# Patient Record
Sex: FEMALE | Race: BLACK OR AFRICAN AMERICAN | NOT HISPANIC OR LATINO | Employment: FULL TIME | URBAN - METROPOLITAN AREA
[De-identification: names, ages, dates, MRNs, and addresses within clinical notes are randomized per-mention and may not be internally consistent; named-entity substitution may affect disease eponyms.]

---

## 2022-03-15 ENCOUNTER — TELEPHONE (OUTPATIENT)
Dept: GASTROENTEROLOGY | Facility: CLINIC | Age: 52
End: 2022-03-15

## 2022-03-15 NOTE — TELEPHONE ENCOUNTER
----- Message from Kayce Wilkerson sent at 3/7/2022  2:23 PM CST -----  Regarding: colonoscopy    ----- Message -----  From: Piedad Armstrong  Sent: 3/7/2022   1:56 PM CST  To: Derrick [facility]A Staff    Pt would like to know when her next colonoscopy is due. Call back is [phone]

## 2022-12-30 DIAGNOSIS — N63.20 MASS OF LEFT BREAST: Primary | ICD-10-CM

## 2023-01-09 ENCOUNTER — OFFICE VISIT (OUTPATIENT)
Dept: SURGERY | Facility: CLINIC | Age: 53
End: 2023-01-09
Payer: COMMERCIAL

## 2023-01-09 DIAGNOSIS — N63.21 MASS OF UPPER OUTER QUADRANT OF LEFT BREAST: ICD-10-CM

## 2023-01-09 PROCEDURE — 1160F RVW MEDS BY RX/DR IN RCRD: CPT | Mod: CPTII,S$GLB,, | Performed by: SURGERY

## 2023-01-09 PROCEDURE — 1159F MED LIST DOCD IN RCRD: CPT | Mod: CPTII,S$GLB,, | Performed by: SURGERY

## 2023-01-09 PROCEDURE — 1159F PR MEDICATION LIST DOCUMENTED IN MEDICAL RECORD: ICD-10-PCS | Mod: CPTII,S$GLB,, | Performed by: SURGERY

## 2023-01-09 PROCEDURE — 99203 PR OFFICE/OUTPT VISIT, NEW, LEVL III, 30-44 MIN: ICD-10-PCS | Mod: S$GLB,,, | Performed by: SURGERY

## 2023-01-09 PROCEDURE — 1160F PR REVIEW ALL MEDS BY PRESCRIBER/CLIN PHARMACIST DOCUMENTED: ICD-10-PCS | Mod: CPTII,S$GLB,, | Performed by: SURGERY

## 2023-01-09 PROCEDURE — 99203 OFFICE O/P NEW LOW 30 MIN: CPT | Mod: S$GLB,,, | Performed by: SURGERY

## 2023-01-09 RX ORDER — SELENIUM SULFIDE 2.5 MG/100ML
LOTION TOPICAL
COMMUNITY
Start: 2022-11-18

## 2023-01-09 RX ORDER — NYSTATIN AND TRIAMCINOLONE ACETONIDE 100000; 1 [USP'U]/G; MG/G
CREAM TOPICAL
COMMUNITY

## 2023-01-09 RX ORDER — ASPIRIN 325 MG
TABLET, DELAYED RELEASE (ENTERIC COATED) ORAL
COMMUNITY

## 2023-01-09 RX ORDER — MUPIROCIN 20 MG/G
OINTMENT TOPICAL
COMMUNITY

## 2023-01-09 RX ORDER — PANTOPRAZOLE SODIUM 40 MG/1
40 TABLET, DELAYED RELEASE ORAL
COMMUNITY
Start: 2022-12-22 | End: 2023-08-10 | Stop reason: SDUPTHER

## 2023-01-09 RX ORDER — LEVOTHYROXINE SODIUM 100 UG/1
TABLET ORAL
COMMUNITY

## 2023-01-09 RX ORDER — CLOBETASOL PROPIONATE 0.5 MG/G
CREAM TOPICAL
COMMUNITY
Start: 2022-12-23

## 2023-01-09 RX ORDER — SITAGLIPTIN AND METFORMIN HYDROCHLORIDE 1000; 50 MG/1; MG/1
TABLET, FILM COATED, EXTENDED RELEASE ORAL
COMMUNITY
Start: 2023-01-04

## 2023-01-09 RX ORDER — AMLODIPINE BESYLATE 10 MG/1
10 TABLET ORAL
COMMUNITY
Start: 2022-12-22

## 2023-01-09 RX ORDER — METHOCARBAMOL 500 MG/1
500 TABLET, FILM COATED ORAL EVERY 8 HOURS PRN
COMMUNITY
Start: 2022-10-26

## 2023-01-09 RX ORDER — LINACLOTIDE 145 UG/1
145 CAPSULE, GELATIN COATED ORAL DAILY PRN
COMMUNITY
Start: 2022-12-22 | End: 2023-08-10

## 2023-01-09 RX ORDER — KETOCONAZOLE 20 MG/G
CREAM TOPICAL
COMMUNITY

## 2023-01-09 RX ORDER — MONTELUKAST SODIUM 10 MG/1
10 TABLET ORAL NIGHTLY
COMMUNITY
Start: 2022-09-21

## 2023-01-09 RX ORDER — HYDROCHLOROTHIAZIDE 25 MG/1
25 TABLET ORAL
COMMUNITY
Start: 2022-12-22

## 2023-01-09 NOTE — PROGRESS NOTES
History & Physical    SUBJECTIVE:     History of Present Illness:    52-year-old female referred by Dr. Monty Apple for 2nd opinion regarding abnormal screening mammogram and ultrasound.  Patient with no family history of breast cancer in his on no hormonal replacement therapy.  She notices no significant change to either breast.  On her mammogram and ultrasound she has a small 0.5 cm hypoechoic mass 2:00 a.m. axis zone 1 that is unchanged compared to prior exam of 10/21.  She is also noted to have a subcentimeter intraparenchymal lymph node in the left breast zone 2.    Chief Complaint   Patient presents with    Breast Problem     Lymph node, left breast         Review of patient's allergies indicates:  Review of patient's allergies indicates:  No Known Allergies    Current Outpatient Medications on File Prior to Visit   Medication Sig Dispense Refill    amLODIPine (NORVASC) 10 MG tablet Take 10 mg by mouth.      aspirin 81 mg Cap aspirin low 81mg chw   CHEW AND SWALLOW 1 TABLET BY MOUTH ONCE DAILY      cholecalciferol, vitamin D3, (DECARA) 1,250 mcg (50,000 unit) capsule Decara 1,250 mcg (50,000 unit) capsule   TAKE 1 CAPSULE BY MOUTH ONCE A WEEK      clobetasoL (TEMOVATE) 0.05 % cream APPLY TO RASH ONL LEGS TWICE DAILY      diphenhydrAMINE-acetaminophen (TYLENOL PM)  mg Tab Take 1 tablet by mouth nightly as needed.      hydroCHLOROthiazide (HYDRODIURIL) 25 MG tablet Take 25 mg by mouth.      JANUMET XR 50-1,000 mg TM24       ketoconazole (NIZORAL) 2 % cream ketoconazole 2 % topical cream   APPLY TO LEG TWICE DAILY      levothyroxine (EUTHYROX) 100 MCG tablet Euthyrox 100 mcg tablet   TAKE 1 TABLET BY MOUTH ONCE DAILY IN THE MORNING      LINZESS 145 mcg Cap capsule Take 145 mcg by mouth daily as needed.      methocarbamoL (ROBAXIN) 500 MG Tab Take 500 mg by mouth every 8 (eight) hours as needed. FOR MUSCLE SPASM      montelukast (SINGULAIR) 10 mg tablet Take 10 mg by mouth every evening.      mupirocin  (BACTROBAN) 2 % ointment mupirocin 2 % topical ointment   APPLY OINTMENT TOPICALLY TO LEG TWICE DAILY      nystatin-triamcinolone (MYCOLOG II) cream nystatin-triamcinolone 100,000 unit/g-0.1 % topical cream   APPLY CREAM TOPICALLY THREE TIMES DAILY AS NEEDED FOR RASH OR ITCHING TO VAGINA AREA      pantoprazole (PROTONIX) 40 MG tablet Take 40 mg by mouth.      selenium sulfide 2.5 % Lotn        No current facility-administered medications on file prior to visit.       Past Medical History:   Diagnosis Date    Cushing's disease     Diabetes mellitus, type 2     Hypertension     Hypothyroidism      Past Surgical History:   Procedure Laterality Date    HYSTERECTOMY      intestinal prolapse  2005    SURGICAL REMOVAL OF PITUITARY TUMOR BY TRANSSPHENOIDAL APPROACH  2019     Family History   Problem Relation Age of Onset    Hypertension Mother     Diabetes Mother     Hypertension Father     Diabetes Father     Lung cancer Father        Social History     Socioeconomic History    Marital status: Single   Tobacco Use    Smoking status: Never    Smokeless tobacco: Never          Review of Systems   Constitutional: Negative.    Respiratory: Negative.     Cardiovascular: Negative.    Gastrointestinal: Negative.    Musculoskeletal: Negative.    Neurological: Negative.    Endo/Heme/Allergies: Negative.    Psychiatric/Behavioral: Negative.       OBJECTIVE:     There were no vitals filed for this visit.              Physical Exam:  Physical Exam  Constitutional:       Appearance: She is normal weight.   Cardiovascular:      Rate and Rhythm: Regular rhythm.   Pulmonary:      Effort: Pulmonary effort is normal.   Chest:      Comments: Bilateral breast examination performed.  Right breast:  No palpable masses or abnormal skin changes noted to the right breast.  Axilla negative.  Nipple areola complex normal.  Left breast:.  No abnormal masses or skin changes noted.  Axilla negative.  Nipple areola complex normal  Musculoskeletal:          General: Normal range of motion.   Skin:     General: Skin is warm and dry.   Neurological:      General: No focal deficit present.      Mental Status: She is alert and oriented to person, place, and time.      Cranial Nerves: No cranial nerve deficit.           ASSESSMENT/PLAN:   5 mm left breast mass, stable.  Subcentimeter lymph node left breast.  Benign findings BI-RADS category 2  PLAN:  Reassurance.  These are likely benign findings and I feel nothing further needs to be done at this time.  Recommend routine yearly screening mammogram back/ ultrasound

## 2023-03-08 ENCOUNTER — TELEPHONE (OUTPATIENT)
Dept: GASTROENTEROLOGY | Facility: CLINIC | Age: 53
End: 2023-03-08
Payer: COMMERCIAL

## 2023-03-08 NOTE — TELEPHONE ENCOUNTER
----- Message from Ana Maria Modi LPN sent at 3/8/2023 10:18 AM CST -----  Contact: self    ----- Message -----  From: Beth Roche  Sent: 3/8/2023   9:43 AM CST  To: Derrick [facility] Staff    Type:  Sooner Apoointment Request    Caller is requesting a sooner appointment.  Caller declined first available appointment listed below.  Caller will not accept being placed on the waitlist and is requesting a message be sent to doctor.  Name of Caller:Anamaria Bahena    When is the first available appointment?04/2023  Symptoms:(hoarseness, difficulty swallowing, gerd   Would the patient rather a call back or a response via [facility]? Call back  Best Call Back Number:[phone]    Additional Information: n/a

## 2023-03-09 NOTE — TELEPHONE ENCOUNTER
The patient is due for her colonoscopy with double-channel colonoscope. Are her upper GI Sx similar to prior or different. May schedule for EGD and colonoscopy with double-channel colonoscope at [facility] if similar to prior. May add on to 3/28/2023 Tuesday.  NBP

## 2023-03-13 ENCOUNTER — TELEPHONE (OUTPATIENT)
Dept: ADMINISTRATIVE | Facility: CLINIC | Age: 53
End: 2023-03-13
Payer: COMMERCIAL

## 2023-03-14 NOTE — TELEPHONE ENCOUNTER
Called pt to see if she was ready to scheduled her EGD/Colon @ [facility]. I informed her that I s/w Nancy in our insurance dept and she stated that her cost would be $75 for her physician fee. However, she was unsure what [facility] would cost. Since, pt has not met her ded she would need to met that first.       Pt would like to wait and schedule when she had the money and knows exactly what [facility] would charge her. I told the pt to contact us back when she was ready. Pt agreed. xochilt

## 2023-08-03 NOTE — TELEPHONE ENCOUNTER
Pt called in to report that she is having a lot of problems. She is aware that she is due for a colonoscopy.  She wants to be seen in office for low back pain, shooting pains in her rectum. She feels pressure. Feels this mostly at night while she is lying down. She knows that this is normal. This has been going on since January. Says she can not ignore it anymore and would like to be seen sooner than next available. She take Linzess 145 daily and is still experiencing constipation. Please advise.

## 2023-08-03 NOTE — TELEPHONE ENCOUNTER
Ok to make next available with NP. She may try to take miralax 1 capful daily with her linzess to see if that helps with the constipation.   KN

## 2023-08-04 NOTE — PROGRESS NOTES
Clinic Note    Reason for visit:  The primary encounter diagnosis was Irritable bowel syndrome with constipation. Diagnoses of Dysphagia, unspecified type, Gastroesophageal reflux disease, unspecified whether esophagitis present, History of colon polyps, Low back pain without sciatica, unspecified back pain laterality, unspecified chronicity, and Urinary frequency were also pertinent to this visit.    PCP: Aranza Barraza       HPI:  This is a 52 y.o. female who is established with Dr. Meza. Patient with IBS-C. She has been on Linzess 145 mcg daily and also takes Miralax and stool softener with stimulant. She has been taking 2 of the Linzess 145 mcg. Since 1/2023, she has had episodes of shooting pains in rectum (which is new to her), as well as low back pain. Feels this more when lying down. Her stool is usually consistency of sand. No blood in stool. She also reports GERD, hoarseness, and dysphagia with pills.  She was having hoarseness on and off since 2017, saw ENT and put on sinus medication but still with hoarseness at times. She has been intentionally losing weight. She takes pantoprazole 40 mg as needed, only when heartburn severe.     She states she just had blood work and dropped off [facility] but Dr. Apple forgot to include UA order. She asked if I could send UA. I told her I would send UA order but if positive, then Dr. Apple would need to treat her and she agreed.      She has h/o functional ACTH pituitary adenoma/Cushing disease, in remission since 2019.     Colonoscopy 11/26/2019 with double channel colonoscope: 1 TA, 2 hyperp. Repeat colon (with adult or double channel) in 3y. Adult colonoscope had broken views.    EGD/Colonoscopy 11/21/2018: Concentric rings in lower third of esophagus, small HH. GBx wnl, EBx reflux. Colonoscopy until ileo-cecal valve reached. The right proximal colon was identified through transabdominal illumination. One polyp removed and not retrieved.      Review  of Systems   Constitutional:  Positive for fatigue. Negative for fever and unexpected weight change.   HENT:  Negative for mouth sores, postnasal drip, sore throat and trouble swallowing.    Eyes:  Negative for pain, discharge and eye dryness.   Respiratory:  Negative for apnea, cough, choking, chest tightness, shortness of breath and wheezing.    Cardiovascular:  Negative for chest pain, palpitations and leg swelling.   Gastrointestinal:  Positive for abdominal distention, constipation, diarrhea, rectal pain and reflux. Negative for abdominal pain, anal bleeding, blood in stool, change in bowel habit, nausea, vomiting, fecal incontinence and change in bowel habit.   Genitourinary:  Positive for bladder incontinence. Negative for dysuria and hematuria.   Musculoskeletal:  Positive for back pain. Negative for arthralgias and joint swelling.   Integumentary:  Negative for color change and rash.   Allergic/Immunologic: Negative for environmental allergies and food allergies.   Neurological:  Negative for seizures and headaches.   Hematological:  Negative for adenopathy. Does not bruise/bleed easily.        Past Medical History:   Diagnosis Date    Cushing's disease     Diabetes mellitus, type 2     H/O: pituitary tumor     Hypertension     Hypothyroidism     IBS (irritable bowel syndrome)     Personal history of colonic polyps      Past Surgical History:   Procedure Laterality Date    HYSTERECTOMY      intestinal prolapse  2005    SURGICAL REMOVAL OF PITUITARY TUMOR BY TRANSSPHENOIDAL APPROACH  2019     Family History   Problem Relation Age of Onset    Hypertension Mother     Diabetes Mother     Hypertension Father     Diabetes Father     Lung cancer Father      Social History     Tobacco Use    Smoking status: Never    Smokeless tobacco: Never     Review of patient's allergies indicates:   Allergen Reactions    Ace inhibitors Swelling    Erythromycin base       Medication List with Changes/Refills   New Medications     LINACLOTIDE (LINZESS) 290 MCG CAP CAPSULE    Take 1 capsule (290 mcg total) by mouth before breakfast.    SODIUM,POTASSIUM,MAG SULFATES (SUPREP BOWEL PREP KIT) 17.5-3.13-1.6 GRAM SOLR    Take according to kit instructions but DO NOT eat breakfast the morning of procedure.   Current Medications    AMLODIPINE (NORVASC) 10 MG TABLET    Take 10 mg by mouth.    BEMPEDOIC ACID/EZETIMIBE (NEXLIZET ORAL)    Take by mouth.    CHOLECALCIFEROL, VITAMIN D3, (DECARA) 1,250 MCG (50,000 UNIT) CAPSULE    Decara 1,250 mcg (50,000 unit) capsule   TAKE 1 CAPSULE BY MOUTH ONCE A WEEK    CLOBETASOL (TEMOVATE) 0.05 % CREAM    APPLY TO RASH ONL LEGS TWICE DAILY    CONTOUR NEXT TEST STRIPS STRP    USE 1 STRIP VIA METER 3 TIMES A DAY AS DIRECTED TO CHECK BLOOD GLUCOSE    DIPHENHYDRAMINE-ACETAMINOPHEN (TYLENOL PM)  MG TAB    Take 1 tablet by mouth nightly as needed.    HYDROCHLOROTHIAZIDE (HYDRODIURIL) 25 MG TABLET    Take 25 mg by mouth.    JANUMET XR 50-1,000 MG TM24        KETOCONAZOLE (NIZORAL) 2 % CREAM    ketoconazole 2 % topical cream   APPLY TO LEG TWICE DAILY    LEVOTHYROXINE (EUTHYROX) 100 MCG TABLET    Euthyrox 100 mcg tablet   TAKE 1 TABLET BY MOUTH ONCE DAILY IN THE MORNING    METHOCARBAMOL (ROBAXIN) 500 MG TAB    Take 500 mg by mouth every 8 (eight) hours as needed. FOR MUSCLE SPASM    MONTELUKAST (SINGULAIR) 10 MG TABLET    Take 10 mg by mouth every evening.    MUPIROCIN (BACTROBAN) 2 % OINTMENT    mupirocin 2 % topical ointment   APPLY OINTMENT TOPICALLY TO LEG TWICE DAILY    NYSTATIN-TRIAMCINOLONE (MYCOLOG II) CREAM    nystatin-triamcinolone 100,000 unit/g-0.1 % topical cream   APPLY CREAM TOPICALLY THREE TIMES DAILY AS NEEDED FOR RASH OR ITCHING TO VAGINA AREA    SELENIUM SULFIDE 2.5 % LOTN       Changed and/or Refilled Medications    Modified Medication Previous Medication    PANTOPRAZOLE (PROTONIX) 40 MG TABLET pantoprazole (PROTONIX) 40 MG tablet       Take 1 tablet (40 mg total) by mouth 2 (two) times daily.     "Take 40 mg by mouth.   Discontinued Medications    ASPIRIN 81 MG CAP    aspirin low 81mg chw   CHEW AND SWALLOW 1 TABLET BY MOUTH ONCE DAILY    LINZESS 145 MCG CAP CAPSULE    Take 145 mcg by mouth daily as needed.         Vital Signs:  /85   Pulse 81   Ht 5' 6" (1.676 m)   Wt 83.9 kg (185 lb)   SpO2 95%   BMI 29.86 kg/m²        Physical Exam  Vitals reviewed.   Constitutional:       General: She is awake. She is not in acute distress.     Appearance: Normal appearance. She is well-developed. She is not ill-appearing, toxic-appearing or diaphoretic.   HENT:      Head: Normocephalic and atraumatic.      Nose: Nose normal.      Mouth/Throat:      Mouth: Mucous membranes are moist.      Pharynx: Oropharynx is clear. No oropharyngeal exudate or posterior oropharyngeal erythema.   Eyes:      General: Lids are normal. Gaze aligned appropriately. No scleral icterus.        Right eye: No discharge.         Left eye: No discharge.      Conjunctiva/sclera: Conjunctivae normal.   Neck:      Trachea: Trachea normal.   Cardiovascular:      Rate and Rhythm: Normal rate and regular rhythm.      Pulses:           Radial pulses are 2+ on the right side and 2+ on the left side.   Pulmonary:      Effort: Pulmonary effort is normal. No respiratory distress.      Breath sounds: No stridor. No wheezing.   Chest:      Chest wall: No tenderness.   Abdominal:      General: Bowel sounds are normal. There is no distension.      Palpations: Abdomen is soft. There is no fluid wave, hepatomegaly or mass.      Tenderness: There is no abdominal tenderness. There is no guarding or rebound.   Musculoskeletal:         General: No tenderness or deformity.      Cervical back: Full passive range of motion without pain and neck supple. No tenderness.      Right lower leg: No edema.      Left lower leg: No edema.   Lymphadenopathy:      Cervical: No cervical adenopathy.   Skin:     General: Skin is warm and dry.      Capillary Refill: Capillary " refill takes less than 2 seconds.      Coloration: Skin is not cyanotic, jaundiced or pale.   Neurological:      General: No focal deficit present.      Mental Status: She is alert and oriented to person, place, and time.      Motor: No tremor.   Psychiatric:         Attention and Perception: Attention normal.         Mood and Affect: Mood and affect normal.         Speech: Speech normal.         Behavior: Behavior normal. Behavior is cooperative.            All of the data above and below has been reviewed by myself and any further interpretations will be reflected in the assessment and plan.   The data includes review of external notes, and independent interpretation of lab results, procedures, x-rays, and imaging reports.      Assessment:  Irritable bowel syndrome with constipation  -     linaCLOtide (LINZESS) 290 mcg Cap capsule; Take 1 capsule (290 mcg total) by mouth before breakfast.  Dispense: 90 capsule; Refill: 3    Dysphagia, unspecified type  -     Ambulatory Referral to External Surgery    Gastroesophageal reflux disease, unspecified whether esophagitis present  -     pantoprazole (PROTONIX) 40 MG tablet; Take 1 tablet (40 mg total) by mouth 2 (two) times daily.  Dispense: 180 tablet; Refill: 1  -     Ambulatory Referral to External Surgery    History of colon polyps  -     Ambulatory Referral to External Surgery  -     sodium,potassium,mag sulfates (SUPREP BOWEL PREP KIT) 17.5-3.13-1.6 gram SolR; Take according to kit instructions but DO NOT eat breakfast the morning of procedure.  Dispense: 1 kit; Refill: 0    Low back pain without sciatica, unspecified back pain laterality, unspecified chronicity  -     Urinalysis, Reflex to Urine Culture Urine, Clean Catch; Future; Expected date: 08/10/2023    Urinary frequency  -     Urinalysis, Reflex to Urine Culture Urine, Clean Catch; Future; Expected date: 08/10/2023    IBS-C: Linzess 145 without relief. She would like to try 290 mcg daily and if does not work  she will take Ibsrela samples. If colonoscopy wnl, she would like to proceed with imaging.  Dysphagia/GERD: concentric rings in esophagus with EBx reflux on EGD 2018. Plan for EBx and will put on panto 40 BID     Recommendations:  Schedule upper and lower endoscopies with Dr. Meza.   Begin taking pantoprazole 40 mg twice a day.   Increase Linzess  to 290 mcg daily. If this does not work you may try Ibsrela twice a day with meals. Notify my office if you would like to change prescription.       Risks, benefits, and alternatives of medical management, any associated procedures, and/or treatment discussed with the patient. Patient given opportunity to ask questions and voices understanding. Patient has elected to proceed with the recommended care modalities as discussed.        Order summary:  Orders Placed This Encounter   Procedures    Urinalysis, Reflex to Urine Culture Urine, Clean Catch    Ambulatory Referral to External Surgery        Instructed patient to notify my office if they have not been contacted within two weeks after any procedures, submitting any samples (biopsies, blood, stool, urine, etc.) or after any imaging (X-ray, CT, MRI, etc.).      Lilian Kay NP    This document may have been created using a voice recognition transcribing system. Incorrect words or phrases may have been missed during proofreading. Please interpret accordingly or contact me for clarification.

## 2023-08-10 ENCOUNTER — OFFICE VISIT (OUTPATIENT)
Dept: GASTROENTEROLOGY | Facility: CLINIC | Age: 53
End: 2023-08-10
Payer: COMMERCIAL

## 2023-08-10 VITALS
OXYGEN SATURATION: 95 % | DIASTOLIC BLOOD PRESSURE: 85 MMHG | WEIGHT: 185 LBS | SYSTOLIC BLOOD PRESSURE: 126 MMHG | BODY MASS INDEX: 29.73 KG/M2 | HEIGHT: 66 IN | HEART RATE: 81 BPM

## 2023-08-10 DIAGNOSIS — K62.89 RECTAL PAIN: ICD-10-CM

## 2023-08-10 DIAGNOSIS — M54.50 LOW BACK PAIN WITHOUT SCIATICA, UNSPECIFIED BACK PAIN LATERALITY, UNSPECIFIED CHRONICITY: ICD-10-CM

## 2023-08-10 DIAGNOSIS — R35.0 URINARY FREQUENCY: ICD-10-CM

## 2023-08-10 DIAGNOSIS — K21.9 GASTROESOPHAGEAL REFLUX DISEASE, UNSPECIFIED WHETHER ESOPHAGITIS PRESENT: ICD-10-CM

## 2023-08-10 DIAGNOSIS — Z86.010 HISTORY OF COLON POLYPS: ICD-10-CM

## 2023-08-10 DIAGNOSIS — R13.10 DYSPHAGIA, UNSPECIFIED TYPE: ICD-10-CM

## 2023-08-10 DIAGNOSIS — K58.1 IRRITABLE BOWEL SYNDROME WITH CONSTIPATION: Primary | ICD-10-CM

## 2023-08-10 PROCEDURE — 3008F PR BODY MASS INDEX (BMI) DOCUMENTED: ICD-10-PCS | Mod: CPTII,S$GLB,,

## 2023-08-10 PROCEDURE — 1160F RVW MEDS BY RX/DR IN RCRD: CPT | Mod: CPTII,S$GLB,,

## 2023-08-10 PROCEDURE — 3079F DIAST BP 80-89 MM HG: CPT | Mod: CPTII,S$GLB,,

## 2023-08-10 PROCEDURE — 1159F PR MEDICATION LIST DOCUMENTED IN MEDICAL RECORD: ICD-10-PCS | Mod: CPTII,S$GLB,,

## 2023-08-10 PROCEDURE — 1160F PR REVIEW ALL MEDS BY PRESCRIBER/CLIN PHARMACIST DOCUMENTED: ICD-10-PCS | Mod: CPTII,S$GLB,,

## 2023-08-10 PROCEDURE — 3074F PR MOST RECENT SYSTOLIC BLOOD PRESSURE < 130 MM HG: ICD-10-PCS | Mod: CPTII,S$GLB,,

## 2023-08-10 PROCEDURE — 3074F SYST BP LT 130 MM HG: CPT | Mod: CPTII,S$GLB,,

## 2023-08-10 PROCEDURE — 99215 PR OFFICE/OUTPT VISIT, EST, LEVL V, 40-54 MIN: ICD-10-PCS | Mod: S$GLB,,,

## 2023-08-10 PROCEDURE — 3008F BODY MASS INDEX DOCD: CPT | Mod: CPTII,S$GLB,,

## 2023-08-10 PROCEDURE — 3079F PR MOST RECENT DIASTOLIC BLOOD PRESSURE 80-89 MM HG: ICD-10-PCS | Mod: CPTII,S$GLB,,

## 2023-08-10 PROCEDURE — 99215 OFFICE O/P EST HI 40 MIN: CPT | Mod: S$GLB,,,

## 2023-08-10 PROCEDURE — 1159F MED LIST DOCD IN RCRD: CPT | Mod: CPTII,S$GLB,,

## 2023-08-10 RX ORDER — PANTOPRAZOLE SODIUM 40 MG/1
40 TABLET, DELAYED RELEASE ORAL 2 TIMES DAILY
Qty: 180 TABLET | Refills: 1 | Status: SHIPPED | OUTPATIENT
Start: 2023-08-10

## 2023-08-10 RX ORDER — SODIUM, POTASSIUM,MAG SULFATES 17.5-3.13G
SOLUTION, RECONSTITUTED, ORAL ORAL
Qty: 1 KIT | Refills: 0 | Status: SHIPPED | OUTPATIENT
Start: 2023-08-10

## 2023-08-10 RX ORDER — BLOOD SUGAR DIAGNOSTIC
STRIP MISCELLANEOUS
COMMUNITY
Start: 2023-02-28

## 2023-08-10 NOTE — PATIENT INSTRUCTIONS
Schedule upper and lower endoscopies with Dr. Meza.   Begin taking pantoprazole 40 mg twice a day.   Increase Linzess  to 290 mcg daily. If this does not work you may try Ibsrela twice a day with meals. Notify my office if you would like to change prescription.    Please notify my office if you have not been contacted within two weeks after any procedures, submitting any samples (biopsies, blood, stool, urine, etc.) or after any imaging (X-ray, CT, MRI, etc.).

## 2023-08-10 NOTE — LETTER
August 10, 2023        Aranza Barraza MD  [address] [facility]  [place] [address]             [facility]  [address] [address]  Phone: [phone]  Fax: [phone]   Patient: Anamaria Bahena   MR Number: [id number]   YOB: 1970   Date of Visit: 8/10/2023       Dear Dr. Barraza:    Thank you for referring Anamaria Bahena to me for evaluation. Attached you will find relevant portions of my assessment and plan of care.    If you have questions, please do not hesitate to call me. I look forward to following Anamaria Bahena along with you.    Sincerely,      Lilian Kay NP            CC  Monty Apple Jr., MD    [facility]

## 2023-08-14 ENCOUNTER — TELEPHONE (OUTPATIENT)
Dept: GASTROENTEROLOGY | Facility: CLINIC | Age: 53
End: 2023-08-14
Payer: COMMERCIAL

## 2023-08-14 DIAGNOSIS — Z12.11 SCREENING FOR MALIGNANT NEOPLASM OF COLON: Primary | ICD-10-CM

## 2023-08-14 NOTE — TELEPHONE ENCOUNTER
"[facility]  [address] [place] [address]  Phone: [phone]  Fax: [phone]    History & Physical         Provider: Dr. Sarita Meza    Patient Name: Anamaria MACKENZIE (age):1970  52 y.o.           Gender: female   Phone: [phone]     Referring Physician: Aranza Barraza     Vital Signs:   Height - 5' 6"  Weight - 185 lb  BMI -  29.86    Plan: Colonoscopy @ [facility]    Encounter Diagnosis   Name Primary?    Screening for malignant neoplasm of colon Yes           History:      Past Medical History:   Diagnosis Date    Cushing's disease     Diabetes mellitus, type 2     H/O: pituitary tumor     Hypertension     Hypothyroidism     IBS (irritable bowel syndrome)     Personal history of colonic polyps       Past Surgical History:   Procedure Laterality Date    HYSTERECTOMY      intestinal prolapse      SURGICAL REMOVAL OF PITUITARY TUMOR BY TRANSSPHENOIDAL APPROACH  2019      Medication List with Changes/Refills   Current Medications    AMLODIPINE (NORVASC) 10 MG TABLET    Take 10 mg by mouth.    BEMPEDOIC ACID/EZETIMIBE (NEXLIZET ORAL)    Take by mouth.    CHOLECALCIFEROL, VITAMIN D3, (DECARA) 1,250 MCG (50,000 UNIT) CAPSULE    Decara 1,250 mcg (50,000 unit) capsule   TAKE 1 CAPSULE BY MOUTH ONCE A WEEK    CLOBETASOL (TEMOVATE) 0.05 % CREAM    APPLY TO RASH ONL LEGS TWICE DAILY    CONTOUR NEXT TEST STRIPS STRP    USE 1 STRIP VIA METER 3 TIMES A DAY AS DIRECTED TO CHECK BLOOD GLUCOSE    DIPHENHYDRAMINE-ACETAMINOPHEN (TYLENOL PM)  MG TAB    Take 1 tablet by mouth nightly as needed.    HYDROCHLOROTHIAZIDE (HYDRODIURIL) 25 MG TABLET    Take 25 mg by mouth.    JANUMET XR 50-1,000 MG TM24        KETOCONAZOLE (NIZORAL) 2 % CREAM    ketoconazole 2 % topical cream   APPLY TO LEG TWICE DAILY    LEVOTHYROXINE (EUTHYROX) 100 MCG TABLET    Euthyrox 100 mcg tablet   TAKE 1 TABLET BY MOUTH ONCE DAILY " IN THE MORNING    LINACLOTIDE (LINZESS) 290 MCG CAP CAPSULE    Take 1 capsule (290 mcg total) by mouth before breakfast.    METHOCARBAMOL (ROBAXIN) 500 MG TAB    Take 500 mg by mouth every 8 (eight) hours as needed. FOR MUSCLE SPASM    MONTELUKAST (SINGULAIR) 10 MG TABLET    Take 10 mg by mouth every evening.    MUPIROCIN (BACTROBAN) 2 % OINTMENT    mupirocin 2 % topical ointment   APPLY OINTMENT TOPICALLY TO LEG TWICE DAILY    NYSTATIN-TRIAMCINOLONE (MYCOLOG II) CREAM    nystatin-triamcinolone 100,000 unit/g-0.1 % topical cream   APPLY CREAM TOPICALLY THREE TIMES DAILY AS NEEDED FOR RASH OR ITCHING TO VAGINA AREA    PANTOPRAZOLE (PROTONIX) 40 MG TABLET    Take 1 tablet (40 mg total) by mouth 2 (two) times daily.    SELENIUM SULFIDE 2.5 % LOTN        SODIUM,POTASSIUM,MAG SULFATES (SUPREP BOWEL PREP KIT) 17.5-3.13-1.6 GRAM SOLR    Take according to kit instructions but DO NOT eat breakfast the morning of procedure.      Review of patient's allergies indicates:   Allergen Reactions    Ace inhibitors Swelling    Erythromycin base       Family History   Problem Relation Age of Onset    Hypertension Mother     Diabetes Mother     Hypertension Father     Diabetes Father     Lung cancer Father       Social History     Tobacco Use    Smoking status: Never    Smokeless tobacco: Never        Physical Examination:     General Appearance:___________________________  HEENT: _____________________________________  Abdomen:____________________________________  Heart:________________________________________  Lungs:_______________________________________  Extremities:___________________________________  Skin:_________________________________________  Endocrine:____________________________________  Genitourinary:_________________________________  Neurological:__________________________________      Patient has been evaluated immediately prior to sedation and is medically cleared for endoscopy with IVCS as an ASA class:  ______      Physician Signature: _________________________       Date: ________  Time: ________

## 2023-08-15 ENCOUNTER — OUTSIDE PLACE OF SERVICE (OUTPATIENT)
Dept: GASTROENTEROLOGY | Facility: CLINIC | Age: 53
End: 2023-08-15

## 2023-08-15 LAB — CRC RECOMMENDATION EXT: NORMAL

## 2023-08-15 PROCEDURE — 45385 COLONOSCOPY W/LESION REMOVAL: CPT | Mod: 33,,, | Performed by: INTERNAL MEDICINE

## 2023-08-15 PROCEDURE — 45385 PR COLONOSCOPY,REMV LESN,SNARE: ICD-10-PCS | Mod: 33,,, | Performed by: INTERNAL MEDICINE

## 2023-08-15 PROCEDURE — 43239 EGD BIOPSY SINGLE/MULTIPLE: CPT | Mod: 51,,, | Performed by: INTERNAL MEDICINE

## 2023-08-15 PROCEDURE — 43239 PR EGD, FLEX, W/BIOPSY, SGL/MULTI: ICD-10-PCS | Mod: 51,,, | Performed by: INTERNAL MEDICINE

## 2023-08-16 LAB — SPECIMEN TO PATHOLOGY: NORMAL

## 2023-08-20 ENCOUNTER — TELEPHONE (OUTPATIENT)
Dept: GASTROENTEROLOGY | Facility: CLINIC | Age: 53
End: 2023-08-20
Payer: COMMERCIAL

## 2023-08-20 DIAGNOSIS — K62.89 RECTAL PAIN: Primary | ICD-10-CM

## 2023-08-20 DIAGNOSIS — M54.50 LOW BACK PAIN WITHOUT SCIATICA, UNSPECIFIED BACK PAIN LATERALITY, UNSPECIFIED CHRONICITY: ICD-10-CM

## 2023-08-20 DIAGNOSIS — K21.9 GASTROESOPHAGEAL REFLUX DISEASE, UNSPECIFIED WHETHER ESOPHAGITIS PRESENT: ICD-10-CM

## 2023-08-20 NOTE — TELEPHONE ENCOUNTER
DBx nl, GBx react w/mini chr inact gitis w/o Hp, EBx reflux, 1 TA, 2 hyp, repeat colonoscopy with double-channel or adult colonoscope in 3 years.     Notify patient. No signs of infection, precancerous cells or Celiac disease on the upper endoscopy biopsies. Her colon polyps were benign. Repeat colonoscopy in 3 years. Update in Health Maintenance section of Epic. Okay to send CT of abdomen and pelvis given her pain.  Also, if swallowing Sx persisting on panto 40 BID, then rec EM to confirm the muscle function of the esophagus is well.  Okay to go back to taking panto as needed if it did not help her upper GI Sx. Last, did Linzess 290 help her constipation or did we need to try the Ibsrela samples?  NBP

## 2023-08-21 NOTE — TELEPHONE ENCOUNTER
Pt informed of results. Pt reports that she does still have the swallowing symptoms and will come by our office to sign EM consents. She will take panto as needed. CT order faxed to central scheduling. Reports that Linzess is working well for her constipation.

## 2023-09-05 ENCOUNTER — TELEPHONE (OUTPATIENT)
Dept: GASTROENTEROLOGY | Facility: CLINIC | Age: 53
End: 2023-09-05
Payer: COMMERCIAL

## 2023-09-05 DIAGNOSIS — K62.89 RECTAL PAIN: ICD-10-CM

## 2023-09-05 DIAGNOSIS — K58.1 IRRITABLE BOWEL SYNDROME WITH CONSTIPATION: Primary | ICD-10-CM

## 2023-09-05 DIAGNOSIS — M54.50 LOW BACK PAIN WITHOUT SCIATICA, UNSPECIFIED BACK PAIN LATERALITY, UNSPECIFIED CHRONICITY: ICD-10-CM

## 2023-09-05 NOTE — TELEPHONE ENCOUNTER
CTE: fatty liver, relatively increased mucosal enh about the sigmoid and rectum, no uterus.    Notify patient that her CT of the small bowel looked well. No signs of small bowel Crohn's disease.    NBP

## 2023-09-05 NOTE — TELEPHONE ENCOUNTER
Pt given results and wanted to know what happens now with the pain that she has in her pelvic area?

## 2023-09-12 NOTE — TELEPHONE ENCOUNTER
If she is referring to the rectal/back pain she described in OV, then we can order a colon transit study to see if stool is building up in a certain part of her colon or rectum, which could cause the pain. She would swallow capsules that contain small markers which are visible on X-ray. She would have abdominal X-rays taken days following to see how the markers move through the colon. Let me know if she would like me to order this.   MLC

## 2023-09-13 NOTE — TELEPHONE ENCOUNTER
Pt confirmed that it is the same pain that was discussed at her OV. She agrees to colon transit study.

## 2023-09-22 ENCOUNTER — DOCUMENTATION ONLY (OUTPATIENT)
Dept: GASTROENTEROLOGY | Facility: CLINIC | Age: 53
End: 2023-09-22
Payer: COMMERCIAL

## 2024-04-03 ENCOUNTER — OFFICE VISIT (OUTPATIENT)
Dept: OBSTETRICS AND GYNECOLOGY | Facility: CLINIC | Age: 54
End: 2024-04-03
Payer: COMMERCIAL

## 2024-04-03 VITALS
WEIGHT: 187 LBS | BODY MASS INDEX: 30.05 KG/M2 | HEIGHT: 66 IN | SYSTOLIC BLOOD PRESSURE: 134 MMHG | DIASTOLIC BLOOD PRESSURE: 84 MMHG | HEART RATE: 86 BPM

## 2024-04-03 DIAGNOSIS — R32 URINARY INCONTINENCE, UNSPECIFIED TYPE: ICD-10-CM

## 2024-04-03 DIAGNOSIS — N95.2 VAGINAL ATROPHY: Primary | ICD-10-CM

## 2024-04-03 LAB
BILIRUB SERPL-MCNC: NEGATIVE MG/DL
BLOOD URINE, POC: NEGATIVE
CLARITY, POC UA: CLEAR
COLOR, POC UA: YELLOW
GLUCOSE UR QL STRIP: 500
KETONES UR QL STRIP: NEGATIVE
LEUKOCYTE ESTERASE URINE, POC: NEGATIVE
NITRITE, POC UA: NEGATIVE
PH, POC UA: 6.5
PROTEIN, POC: NEGATIVE
SPECIFIC GRAVITY, POC UA: 1.02
UROBILINOGEN, POC UA: 0.2

## 2024-04-03 PROCEDURE — 81002 URINALYSIS NONAUTO W/O SCOPE: CPT | Mod: S$GLB,,,

## 2024-04-03 PROCEDURE — 1160F RVW MEDS BY RX/DR IN RCRD: CPT | Mod: CPTII,S$GLB,,

## 2024-04-03 PROCEDURE — 3079F DIAST BP 80-89 MM HG: CPT | Mod: CPTII,S$GLB,,

## 2024-04-03 PROCEDURE — 3075F SYST BP GE 130 - 139MM HG: CPT | Mod: CPTII,S$GLB,,

## 2024-04-03 PROCEDURE — 99203 OFFICE O/P NEW LOW 30 MIN: CPT | Mod: S$GLB,,,

## 2024-04-03 PROCEDURE — 3008F BODY MASS INDEX DOCD: CPT | Mod: CPTII,S$GLB,,

## 2024-04-03 PROCEDURE — 1159F MED LIST DOCD IN RCRD: CPT | Mod: CPTII,S$GLB,,

## 2024-04-03 RX ORDER — ESTRADIOL 0.1 MG/G
1 CREAM VAGINAL
Qty: 42.5 G | Refills: 1 | Status: SHIPPED | OUTPATIENT
Start: 2024-04-04 | End: 2025-04-04

## 2024-04-03 RX ORDER — TIRZEPATIDE 2.5 MG/.5ML
2.5 INJECTION, SOLUTION SUBCUTANEOUS
COMMUNITY
Start: 2023-10-26

## 2024-04-03 NOTE — PROGRESS NOTES
"  Subjective:      Patient ID: Anamaria Bahena is a 53 y.o. female who presents for evaluation today.    Chief Complaint:    Vaginal Atrophy      History of Present Illness  HPI She presents to establish care. Her last pap was normal in June 2023. She had a full hysterectomy in 2005. She reports feeling vaginal dryness, with occasional leaking of urine moreso at night or just with sitting, not with any certain activity. She denies vaginal bleeding. She is no longer on HRT. She also does have constipation, she is on linzess and other laxatives. She sees Dr. Barraza for her primary care and [facility] for diabetes management & pituitary tumor--removed in 2019. She has not been sexually active in many years but may become in the future. She tried Revaree for dryness but was not cost-effective. She is open to trying alternatives.    GYN History  No LMP recorded (lmp unknown). Patient has had a hysterectomy.   Date of Last Pap: Pap smear completed today with HPV co-testing    VITALS  /84   Pulse 86   Ht 5' 6" (1.676 m)   Wt 84.8 kg (187 lb)   LMP  (LMP Unknown)   BMI 30.18 kg/m²   Weight: 84.8 kg (187 lb)   Height: 5' 6" (167.6 cm)     PAST MEDICAL HISTORY  Past Medical History:   Diagnosis Date    Cushing's disease     Diabetes mellitus, type 2     H/O: pituitary tumor     Hypertension     Hypothyroidism     IBS (irritable bowel syndrome)     Personal history of colonic polyps        PAST SURGICAL HISTORY  Past Surgical History:   Procedure Laterality Date    HYSTERECTOMY, TOTAL, LAPAROSCOPIC, WITH SALPINGO-OOPHORECTOMY  2005    intestinal prolapse  2005    SURGICAL REMOVAL OF PITUITARY TUMOR BY TRANSSPHENOIDAL APPROACH  2019       SOCIAL HISTORY  Social History     Tobacco Use   Smoking Status Never   Smokeless Tobacco Never   ,   Social History     Substance and Sexual Activity   Alcohol Use Not Currently        MEDICATIONS  Outpatient Medications Marked as Taking for the 4/3/24 encounter (Office " Visit) with Dolores Stoner NP   Medication Sig Dispense Refill    amLODIPine (NORVASC) 10 MG tablet Take 10 mg by mouth.      bempedoic acid/ezetimibe (NEXLIZET ORAL) Take by mouth.      cholecalciferol, vitamin D3, (DECARA) 1,250 mcg (50,000 unit) capsule Decara 1,250 mcg (50,000 unit) capsule   TAKE 1 CAPSULE BY MOUTH ONCE A WEEK      clobetasoL (TEMOVATE) 0.05 % cream APPLY TO RASH ONL LEGS TWICE DAILY      CONTOUR NEXT TEST STRIPS Strp USE 1 STRIP VIA METER 3 TIMES A DAY AS DIRECTED TO CHECK BLOOD GLUCOSE      diphenhydrAMINE-acetaminophen (TYLENOL PM)  mg Tab Take 1 tablet by mouth nightly as needed.      hydroCHLOROthiazide (HYDRODIURIL) 25 MG tablet Take 25 mg by mouth.      JANUMET XR 50-1,000 mg TM24       ketoconazole (NIZORAL) 2 % cream ketoconazole 2 % topical cream   APPLY TO LEG TWICE DAILY      levothyroxine (EUTHYROX) 100 MCG tablet Euthyrox 100 mcg tablet   TAKE 1 TABLET BY MOUTH ONCE DAILY IN THE MORNING      linaCLOtide (LINZESS) 290 mcg Cap capsule Take 1 capsule (290 mcg total) by mouth before breakfast. 90 capsule 3    methocarbamoL (ROBAXIN) 500 MG Tab Take 500 mg by mouth every 8 (eight) hours as needed. FOR MUSCLE SPASM      montelukast (SINGULAIR) 10 mg tablet Take 10 mg by mouth every evening.      MOUNJARO 2.5 mg/0.5 mL PnIj Inject 2.5 mg into the skin.      nystatin-triamcinolone (MYCOLOG II) cream nystatin-triamcinolone 100,000 unit/g-0.1 % topical cream   APPLY CREAM TOPICALLY THREE TIMES DAILY AS NEEDED FOR RASH OR ITCHING TO VAGINA AREA      pantoprazole (PROTONIX) 40 MG tablet Take 1 tablet (40 mg total) by mouth 2 (two) times daily. 180 tablet 1    selenium sulfide 2.5 % Lotn            Review of Systems   Review of Systems   Constitutional:  Negative for activity change.   Eyes:  Negative for visual disturbance.   Respiratory:  Negative for shortness of breath.    Cardiovascular:  Negative for chest pain.   Gastrointestinal:  Negative for abdominal  pain.   Genitourinary:  Positive for bladder incontinence and vaginal dryness. Negative for vaginal bleeding.        No abnormal vaginal bleeding   Musculoskeletal:  Negative for back pain.   Integumentary:  Negative for rash and breast mass.   Neurological:  Negative for numbness.   Psychiatric/Behavioral:          No mood disturbance or changes    Breast: Negative for mass          Objective:     Physical Exam:   Constitutional: She is oriented to person, place, and time. She appears well-developed.    HENT:   Head: Normocephalic.     Neck: Trachea normal. No thyromegaly present.    Cardiovascular:  Normal rate, regular rhythm and normal heart sounds.             Pulmonary/Chest: Effort normal and breath sounds normal. Right breast exhibits no mass, no nipple discharge and no skin change. Left breast exhibits no mass, no nipple discharge and no skin change.   Mild fibrocystic changes    During the exam self breast exam discussed         Abdominal: Soft. There is no abdominal tenderness. There is no rigidity and no guarding.     Genitourinary:    Vagina normal.      Pelvic exam was performed with patient supine.   The external female genitalia was normal.     Labial bartholins normal.There is no lesion on the right labia. There is no lesion on the left labia. Right adnexum displays no mass and no tenderness. Left adnexum displays no mass and no tenderness. Vaginal atrophy noted. Cervix is absent.Uterus is absent.              Lymphadenopathy:        Head (right side): No submental and no submandibular adenopathy present.        Head (left side): No submental and no submandibular adenopathy present.     She has no cervical adenopathy.    Neurological: She is alert and oriented to person, place, and time.    Skin: Skin is warm.    Psychiatric: She has a normal mood and affect. Her speech is normal and behavior is normal. Thought content normal.          Assessment:        1. Vaginal atrophy    2. Urinary  incontinence, unspecified type       Vaginal atrophy  -     estradioL (ESTRACE) 0.01 % (0.1 mg/gram) vaginal cream; Place 1 g vaginally twice a week.  Dispense: 42.5 g; Refill: 1    Urinary incontinence, unspecified type  -     POCT urine dipstick without microscope         Plan:     Patient instructed to contact the clinic should any questions or concerns arise prior to her next office visit. Patient is happy with the plan of care at this time, verbalizes understanding and denies outstanding questions.      UA negative nitrites/leuks  Discussed pelvic floor PT or myrbetiq to help with leaking, declined for now  Will try estrace for pelvic support & dryness  F/U in 3 mos for annual & med check  Mammogram annually  Consider annual health panel with Primary Care if not done  Colonoscopy as indicated  Bone density discussed  If you don't hear from the office regarding results within 1 week, please call  Follow up in 1 year for annual or sooner as needed  Chaperone present for exam

## 2025-08-07 ENCOUNTER — OFFICE VISIT (OUTPATIENT)
Dept: OBSTETRICS AND GYNECOLOGY | Facility: CLINIC | Age: 55
End: 2025-08-07
Payer: COMMERCIAL

## 2025-08-07 VITALS
HEIGHT: 66 IN | HEART RATE: 99 BPM | DIASTOLIC BLOOD PRESSURE: 81 MMHG | BODY MASS INDEX: 30.18 KG/M2 | SYSTOLIC BLOOD PRESSURE: 134 MMHG

## 2025-08-07 DIAGNOSIS — R10.2 PELVIC PAIN: ICD-10-CM

## 2025-08-07 DIAGNOSIS — Z13.820 SCREENING FOR OSTEOPOROSIS: ICD-10-CM

## 2025-08-07 DIAGNOSIS — Z78.0 MENOPAUSE: ICD-10-CM

## 2025-08-07 DIAGNOSIS — Z12.31 SCREENING MAMMOGRAM FOR BREAST CANCER: ICD-10-CM

## 2025-08-07 DIAGNOSIS — Z01.419 WELL WOMAN EXAM WITH ROUTINE GYNECOLOGICAL EXAM: Primary | ICD-10-CM

## 2025-08-07 DIAGNOSIS — M54.9 BACK PAIN, UNSPECIFIED BACK LOCATION, UNSPECIFIED BACK PAIN LATERALITY, UNSPECIFIED CHRONICITY: ICD-10-CM

## 2025-08-07 LAB
BILIRUB SERPL-MCNC: NORMAL MG/DL
BLOOD URINE, POC: NORMAL
CLARITY, POC UA: CLEAR
COLOR, POC UA: NORMAL
GLUCOSE UR QL STRIP: 500
KETONES UR QL STRIP: NORMAL
LEUKOCYTE ESTERASE URINE, POC: NORMAL
NITRITE, POC UA: NORMAL
PH, POC UA: 5
PROTEIN, POC: NORMAL
SPECIFIC GRAVITY, POC UA: 1.03
UROBILINOGEN, POC UA: 0.2

## 2025-08-07 PROCEDURE — 99396 PREV VISIT EST AGE 40-64: CPT | Mod: S$PBB,,,

## 2025-08-07 PROCEDURE — 3008F BODY MASS INDEX DOCD: CPT | Mod: CPTII,,,

## 2025-08-07 PROCEDURE — 3075F SYST BP GE 130 - 139MM HG: CPT | Mod: CPTII,,,

## 2025-08-07 PROCEDURE — 3079F DIAST BP 80-89 MM HG: CPT | Mod: CPTII,,,

## 2025-08-07 PROCEDURE — 1159F MED LIST DOCD IN RCRD: CPT | Mod: CPTII,,,

## 2025-08-07 RX ORDER — EVOLOCUMAB 140 MG/ML
INJECTION, SOLUTION SUBCUTANEOUS
COMMUNITY

## 2025-08-07 RX ORDER — GLIMEPIRIDE 4 MG/1
1 TABLET ORAL DAILY
COMMUNITY

## 2025-08-07 NOTE — PROGRESS NOTES
"  Subjective:      Patient ID: Anamaria Bahena is a 54 y.o. female who presents for evaluation today.    Chief Complaint:    Well Woman (Back Pain, Pressure and bloated)      History of Present Illness  HPI  Annual Exam (Postmenopausal) - Patient presents for annual exam. The patient has also has complaints of back pain, pressure, bloating. The patient is not sexually active. GYN screening history: last pap: was normal and last mammogram: was normal. The patient is not taking hormone replacement therapy. Patient denies post-menopausal vaginal bleeding. The patient wears seatbelts: yes. The patient participates in regular exercise: yes. Has the patient ever been transfused or tattooed?: not asked. The patient reports that there is not domestic violence in her life. She reports back pain, pressure, & bloating for 1 mos. She was treated for a UTI 1 week ago with urgent care with rocephin shot & completed her macrobid. She still reports some lingering symptoms. She is urinating 5x per night. She also struggles with constipation. She is taking linzess, otc stool softeners. Last BM was normal yesterday. Colonoscopy in 2023. She had a full hysterectomy with BSO in 2005.     GYN History  No LMP recorded (lmp unknown). Patient has had a hysterectomy.   Date of Last Pap: Pap smear completed today with HPV co-testing    VITALS  /81 (BP Location: Right arm, Patient Position: Sitting)   Pulse 99   Ht 5' 6" (1.676 m)   LMP  (LMP Unknown)   BMI 30.18 kg/m²       Height: 5' 6" (167.6 cm)     PAST MEDICAL HISTORY  Past Medical History:   Diagnosis Date    Cushing's disease     Diabetes mellitus, type 2     H/O: pituitary tumor     Hypercholesterolemia     Hypertension     Hypothyroidism     IBS (irritable bowel syndrome)     Personal history of colonic polyps        PAST SURGICAL HISTORY  Past Surgical History:   Procedure Laterality Date    HYSTERECTOMY, TOTAL, LAPAROSCOPIC, WITH SALPINGO-OOPHORECTOMY  2005    intestinal " prolapse  2005    SURGICAL REMOVAL OF PITUITARY TUMOR BY TRANSSPHENOIDAL APPROACH  2019       SOCIAL HISTORY  Tobacco Use History[1],   Social History     Substance and Sexual Activity   Alcohol Use Not Currently        MEDICATIONS  Outpatient Medications Marked as Taking for the 8/7/25 encounter (Office Visit) with Dolores Stoner NP   Medication Sig Dispense Refill    amLODIPine (NORVASC) 10 MG tablet Take 10 mg by mouth.      bempedoic acid/ezetimibe (NEXLIZET ORAL) Take by mouth.      cholecalciferol, vitamin D3, (DECARA) 1,250 mcg (50,000 unit) capsule Decara 1,250 mcg (50,000 unit) capsule   TAKE 1 CAPSULE BY MOUTH ONCE A WEEK      clobetasoL (TEMOVATE) 0.05 % cream APPLY TO RASH ONL LEGS TWICE DAILY      CONTOUR NEXT TEST STRIPS Strp USE 1 STRIP VIA METER 3 TIMES A DAY AS DIRECTED TO CHECK BLOOD GLUCOSE      diphenhydrAMINE-acetaminophen (TYLENOL PM)  mg Tab Take 1 tablet by mouth nightly as needed.      evolocumab (REPATHA SURECLICK) 140 mg/mL PnIj       glimepiride (AMARYL) 4 MG tablet Take 1 tablet by mouth once daily.      hydroCHLOROthiazide (HYDRODIURIL) 25 MG tablet Take 25 mg by mouth.      JANUMET XR 50-1,000 mg TM24       ketoconazole (NIZORAL) 2 % cream ketoconazole 2 % topical cream   APPLY TO LEG TWICE DAILY      levothyroxine (EUTHYROX) 100 MCG tablet Euthyrox 100 mcg tablet   TAKE 1 TABLET BY MOUTH ONCE DAILY IN THE MORNING      linaCLOtide (LINZESS) 290 mcg Cap capsule Take 1 capsule (290 mcg total) by mouth before breakfast. 90 capsule 3    methocarbamoL (ROBAXIN) 500 MG Tab Take 500 mg by mouth every 8 (eight) hours as needed. FOR MUSCLE SPASM      montelukast (SINGULAIR) 10 mg tablet Take 10 mg by mouth every evening.      nystatin-triamcinolone (MYCOLOG II) cream nystatin-triamcinolone 100,000 unit/g-0.1 % topical cream   APPLY CREAM TOPICALLY THREE TIMES DAILY AS NEEDED FOR RASH OR ITCHING TO VAGINA AREA      pantoprazole (PROTONIX) 40 MG tablet Take 1 tablet (40 mg total) by  mouth 2 (two) times daily. 180 tablet 1    selenium sulfide 2.5 % Lotn            Review of Systems   Review of Systems   Constitutional:  Negative for activity change.   Eyes:  Negative for visual disturbance.   Respiratory:  Negative for shortness of breath.    Cardiovascular:  Negative for chest pain.   Gastrointestinal:  Positive for bloating. Negative for abdominal pain.   Genitourinary:  Negative for vaginal bleeding.        No abnormal vaginal bleeding   Musculoskeletal:  Positive for back pain.   Integumentary:  Negative for rash and breast mass.   Neurological:  Negative for numbness.   Psychiatric/Behavioral:          No mood disturbance or changes    Breast: Negative for mass          Objective:     Physical Exam:   Constitutional: She is oriented to person, place, and time. She appears well-developed.    HENT:   Head: Normocephalic.     Neck: Trachea normal. No thyromegaly present.    Cardiovascular:  Normal rate, regular rhythm and normal heart sounds.             Pulmonary/Chest: Effort normal and breath sounds normal. Right breast exhibits no mass, no nipple discharge and no skin change. Left breast exhibits no mass, no nipple discharge and no skin change.   Mild fibrocystic changes    During the exam self breast exam discussed         Abdominal: Soft. Bowel sounds are normal. There is no abdominal tenderness. There is no rigidity, no guarding, no right CVA tenderness and no left CVA tenderness.     Genitourinary:    Vagina normal.      Pelvic exam was performed with patient in the lithotomy position.   The external female genitalia was normal.     Labial bartholins normal.There is no lesion on the right labia. There is no lesion on the left labia. Right adnexum displays no mass and no tenderness. Left adnexum displays tenderness. Left adnexum displays no mass. No rectocele or cystocele in the vagina. Cervix is absent.Uterus is absent.              Lymphadenopathy:        Head (right side): No  submental and no submandibular adenopathy present.        Head (left side): No submental and no submandibular adenopathy present.     She has no cervical adenopathy.    Neurological: She is alert and oriented to person, place, and time.    Skin: Skin is warm.    Psychiatric: She has a normal mood and affect. Her speech is normal and behavior is normal. Thought content normal.          Assessment:     Well woman exam with routine gynecological exam  -     Liquid-based pap smear, screening    Back pain, unspecified back location, unspecified back pain laterality, unspecified chronicity  -     POCT urine dipstick without microscope    Screening mammogram for breast cancer  -     Mammo Digital Screening Bilat; Future; Expected date: 08/07/2025    Screening for osteoporosis  -     DXA Bone Density Axial Skeleton 1 or more sites; Future; Expected date: 08/07/2025    Menopause  -     DXA Bone Density Axial Skeleton 1 or more sites; Future; Expected date: 08/07/2025    Pelvic pain  -     US OB/GYN Procedure ([facility]); Future         Plan:     Patient instructed to contact the clinic should any questions or concerns arise prior to her next office visit. Patient is happy with the plan of care at this time, verbalizes understanding and denies outstanding questions.      Pap  Mammogram  Self-breast exams  UA- in office, offered to order CT of her abdomen/pelvis. Discussed possible ovarian remnant syndrome. She will call her PCP 1st & call back if needed  Consider annual health panel with Primary Care if not done  Colonoscopy as indicated  Bone density discussed  If you don't hear from the office regarding results within 1 week, please call  Follow up in 1 year for annual or sooner as needed  Chaperone present for exam   Female chaperone present.          [1]   Social History  Tobacco Use   Smoking Status Never   Smokeless Tobacco Never